# Patient Record
Sex: FEMALE | Race: WHITE | NOT HISPANIC OR LATINO | Employment: FULL TIME | RURAL
[De-identification: names, ages, dates, MRNs, and addresses within clinical notes are randomized per-mention and may not be internally consistent; named-entity substitution may affect disease eponyms.]

---
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
## 2023-11-13 ENCOUNTER — HOSPITAL ENCOUNTER (EMERGENCY)
Facility: HOSPITAL | Age: 23
Discharge: HOME | End: 2023-11-13
Payer: COMMERCIAL

## 2023-11-13 VITALS
WEIGHT: 158 LBS | RESPIRATION RATE: 18 BRPM | BODY MASS INDEX: 29.83 KG/M2 | TEMPERATURE: 98 F | SYSTOLIC BLOOD PRESSURE: 137 MMHG | OXYGEN SATURATION: 100 % | HEIGHT: 61 IN | HEART RATE: 90 BPM | DIASTOLIC BLOOD PRESSURE: 83 MMHG

## 2023-11-13 DIAGNOSIS — S89.91XA INJURY OF RIGHT LOWER LEG: ICD-10-CM

## 2023-11-13 DIAGNOSIS — S80.11XA CONTUSION OF RIGHT LOWER LEG, INITIAL ENCOUNTER: Primary | ICD-10-CM

## 2023-11-13 PROCEDURE — 99284 PR EMERGENCY DEPT VISIT,LEVEL IV: ICD-10-PCS | Mod: ,,, | Performed by: NURSE PRACTITIONER

## 2023-11-13 PROCEDURE — 99284 EMERGENCY DEPT VISIT MOD MDM: CPT | Mod: ,,, | Performed by: NURSE PRACTITIONER

## 2023-11-13 PROCEDURE — 99283 EMERGENCY DEPT VISIT LOW MDM: CPT

## 2023-11-13 RX ORDER — IBUPROFEN 800 MG/1
800 TABLET ORAL 3 TIMES DAILY
Qty: 20 TABLET | Refills: 0 | Status: SHIPPED | OUTPATIENT
Start: 2023-11-13

## 2023-11-13 NOTE — Clinical Note
"Lakia"NINOSKA Dukes was seen and treated in our emergency department on 11/13/2023.  She may return to work on 11/15/2023.       If you have any questions or concerns, please don't hesitate to call.      Licha Lentz, FNP"

## 2023-11-14 NOTE — DISCHARGE INSTRUCTIONS
Rest ice and elevate extremity for comfort.  Crutches for nonweightbearing until symptoms improve.  Take medications as prescribed.  Follow up with primary care provider in 2 days if symptoms do not improve with treatment.  Return to the ER with new or worsening symptoms.

## 2023-11-14 NOTE — ED PROVIDER NOTES
Encounter Date: 11/13/2023       History     Chief Complaint   Patient presents with    Leg Injury     Pt was standing on top of a ladder and fell. R lower leg pain and swelling.     Patient was brought to the ER by her mother and father.  Patient reports she slipped on a ladder and injured her right lower extremity.  She states the ladder fell against her leg just above her ankle.  She states her right leg is painful to bear weight.  There is small abrasion noted on the shin bone.  Area swollen and tender to touch.  Patient denies hitting her head or LOC.    The history is provided by the patient and a parent. No  was used.     Review of patient's allergies indicates:   Allergen Reactions    Bactrim [sulfamethoxazole-trimethoprim] Hives    Sulfa (sulfonamide antibiotics) Hives     No past medical history on file.  No past surgical history on file.  No family history on file.     Review of Systems   Constitutional:  Positive for activity change and fatigue.   Musculoskeletal:  Positive for arthralgias and myalgias.        Right lower extremity pain and edema   All other systems reviewed and are negative.      Physical Exam     Initial Vitals [11/13/23 2049]   BP Pulse Resp Temp SpO2   137/83 107 20 97.5 °F (36.4 °C) 97 %      MAP       --         Physical Exam    Nursing note and vitals reviewed.  Constitutional: She appears well-developed and well-nourished.   HENT:   Head: Normocephalic.   Nose: Nose normal.   Mouth/Throat: Oropharynx is clear and moist.   Eyes: EOM are normal.   Neck:   Normal range of motion.  Cardiovascular:  Normal rate, regular rhythm, normal heart sounds and intact distal pulses.           Pulmonary/Chest: Breath sounds normal.   Abdominal: Bowel sounds are normal.   Musculoskeletal:         General: Tenderness and edema present.      Cervical back: Normal range of motion.     Neurological: She is alert and oriented to person, place, and time. She has normal strength. GCS  score is 15. GCS eye subscore is 4. GCS verbal subscore is 5. GCS motor subscore is 6.   Skin: Skin is warm and dry. Capillary refill takes less than 2 seconds. Abrasion noted.        Psychiatric: She has a normal mood and affect.         Medical Screening Exam   See Full Note    ED Course   Procedures  Labs Reviewed - No data to display       Imaging Results              X-Ray Tibia Fibula 2 View Right (In process)                      Medications - No data to display  Medical Decision Making  Patient was brought to the ER by her mother and father.  Patient reports she slipped on a ladder and injured her right lower extremity.  She states the ladder fell against her leg just above her ankle.  She states her right leg is painful to bear weight.  There is small abrasion noted on the shin bone.  Area swollen and tender to touch.  Patient denies hitting her head or LOC.      Amount and/or Complexity of Data Reviewed  Independent Historian: parent  Radiology: ordered. Decision-making details documented in ED Course.  Discussion of management or test interpretation with external provider(s): Ace wrap for comfort.    Patient was discharged home with diagnosis of injury to right lower leg contusion to right lower leg initial encounter.  She was given prescription for ibuprofen 800 mg to take as prescribed.  She was told to rest ice and elevate extremity for comfort.  She was told to follow up with primary care provider in 2 days if symptoms do not improve with treatment.    Risk  Prescription drug management.                               Clinical Impression:   Final diagnoses:  [S89.91XA] Injury of right lower leg  [S80.11XA] Contusion of right lower leg, initial encounter (Primary)        ED Disposition Condition    Discharge Stable          ED Prescriptions       Medication Sig Dispense Start Date End Date Auth. Provider    ibuprofen (ADVIL,MOTRIN) 800 MG tablet Take 1 tablet (800 mg total) by mouth 3 (three) times  daily. 20 tablet 11/13/2023 -- Licha Lentz FNP          Follow-up Information       Follow up With Specialties Details Why Contact Info    Primary Care Provider  Schedule an appointment as soon as possible for a visit in 2 days If symptoms worsen              Licha Lentz FNP  11/13/23 9917

## 2023-11-17 ENCOUNTER — TELEPHONE (OUTPATIENT)
Dept: OBSTETRICS AND GYNECOLOGY | Facility: CLINIC | Age: 23
End: 2023-11-17

## 2023-12-06 ENCOUNTER — PATIENT MESSAGE (OUTPATIENT)
Dept: OBSTETRICS AND GYNECOLOGY | Facility: CLINIC | Age: 23
End: 2023-12-06

## 2023-12-14 ENCOUNTER — OFFICE VISIT (OUTPATIENT)
Dept: OBSTETRICS AND GYNECOLOGY | Facility: CLINIC | Age: 23
End: 2023-12-14
Payer: COMMERCIAL

## 2023-12-14 VITALS
SYSTOLIC BLOOD PRESSURE: 110 MMHG | DIASTOLIC BLOOD PRESSURE: 70 MMHG | HEART RATE: 70 BPM | WEIGHT: 158 LBS | HEIGHT: 61 IN | BODY MASS INDEX: 29.83 KG/M2

## 2023-12-14 DIAGNOSIS — Z01.419 NORMAL GYNECOLOGIC EXAMINATION: Primary | ICD-10-CM

## 2023-12-14 DIAGNOSIS — Z12.4 SCREENING FOR CERVICAL CANCER: ICD-10-CM

## 2023-12-14 PROCEDURE — 3074F PR MOST RECENT SYSTOLIC BLOOD PRESSURE < 130 MM HG: ICD-10-PCS | Mod: ,,, | Performed by: ADVANCED PRACTICE MIDWIFE

## 2023-12-14 PROCEDURE — 3078F DIAST BP <80 MM HG: CPT | Mod: ,,, | Performed by: ADVANCED PRACTICE MIDWIFE

## 2023-12-14 PROCEDURE — 3008F BODY MASS INDEX DOCD: CPT | Mod: ,,, | Performed by: ADVANCED PRACTICE MIDWIFE

## 2023-12-14 PROCEDURE — 99213 OFFICE O/P EST LOW 20 MIN: CPT | Mod: PBBFAC | Performed by: ADVANCED PRACTICE MIDWIFE

## 2023-12-14 PROCEDURE — 99395 PR PREVENTIVE VISIT,EST,18-39: ICD-10-PCS | Mod: S$PBB,,, | Performed by: ADVANCED PRACTICE MIDWIFE

## 2023-12-14 PROCEDURE — 3008F PR BODY MASS INDEX (BMI) DOCUMENTED: ICD-10-PCS | Mod: ,,, | Performed by: ADVANCED PRACTICE MIDWIFE

## 2023-12-14 PROCEDURE — 1159F PR MEDICATION LIST DOCUMENTED IN MEDICAL RECORD: ICD-10-PCS | Mod: ,,, | Performed by: ADVANCED PRACTICE MIDWIFE

## 2023-12-14 PROCEDURE — 99395 PREV VISIT EST AGE 18-39: CPT | Mod: S$PBB,,, | Performed by: ADVANCED PRACTICE MIDWIFE

## 2023-12-14 PROCEDURE — 88142 CYTOPATH C/V THIN LAYER: CPT | Mod: TC,GCY | Performed by: ADVANCED PRACTICE MIDWIFE

## 2023-12-14 PROCEDURE — 3074F SYST BP LT 130 MM HG: CPT | Mod: ,,, | Performed by: ADVANCED PRACTICE MIDWIFE

## 2023-12-14 PROCEDURE — 3078F PR MOST RECENT DIASTOLIC BLOOD PRESSURE < 80 MM HG: ICD-10-PCS | Mod: ,,, | Performed by: ADVANCED PRACTICE MIDWIFE

## 2023-12-14 PROCEDURE — 1159F MED LIST DOCD IN RCRD: CPT | Mod: ,,, | Performed by: ADVANCED PRACTICE MIDWIFE

## 2023-12-14 RX ORDER — NORGESTIMATE AND ETHINYL ESTRADIOL 7DAYSX3 LO
KIT ORAL
COMMUNITY
Start: 2023-05-04 | End: 2025-03-29

## 2023-12-14 NOTE — PROGRESS NOTES
Subjective     Patient ID: Lakia Dukes is a 23 y.o. female.    Chief Complaint: Gynecologic Exam (In for Annual Exam. Denies any problems at this time. Pt has never had a pap.)    Pap collected today.   Periods regular lasting 4-6 days.  Denies any complaints or problems.       Gynecologic Exam      Review of Systems   Constitutional: Negative.    HENT: Negative.     Eyes: Negative.    Respiratory: Negative.     Cardiovascular: Negative.    Gastrointestinal: Negative.    Endocrine: Negative.    Genitourinary: Negative.    Musculoskeletal: Negative.    Integumentary:  Negative.   Allergic/Immunologic: Negative.    Neurological: Negative.    Psychiatric/Behavioral: Negative.            Objective     Physical Exam  Vitals reviewed. Exam conducted with a chaperone present.   Constitutional:       Appearance: Normal appearance.   HENT:      Head: Normocephalic.   Cardiovascular:      Rate and Rhythm: Normal rate and regular rhythm.   Pulmonary:      Effort: Pulmonary effort is normal.      Breath sounds: Normal breath sounds.   Chest:   Breasts:     Right: Normal. No mass.      Left: Normal. No mass.   Abdominal:      General: Abdomen is flat.      Palpations: Abdomen is soft.   Genitourinary:     General: Normal vulva.      Exam position: Lithotomy position.      Vagina: Normal.      Cervix: No cervical motion tenderness.      Uterus: Normal. Not tender.       Adnexa: Right adnexa normal and left adnexa normal.        Right: No mass.          Left: No mass.     Musculoskeletal:         General: Normal range of motion.      Cervical back: Normal range of motion.   Skin:     General: Skin is warm and dry.   Neurological:      Mental Status: She is alert and oriented to person, place, and time.   Psychiatric:         Mood and Affect: Mood normal.         Behavior: Behavior normal.            Assessment and Plan     1. Normal gynecologic examination    2. Screening for cervical cancer  -     ThinPrep Pap  Test             Follow up in about 1 year (around 12/14/2024), or if symptoms worsen or fail to improve, for Annual Exam.

## 2023-12-18 LAB
GH SERPL-MCNC: NORMAL NG/ML
INSULIN SERPL-ACNC: NORMAL U[IU]/ML
LAB AP CLINICAL INFORMATION: NORMAL
LAB AP GYN INTERPRETATION: NEGATIVE
LAB AP PAP DISCLAIMER COMMENTS: NORMAL
RENIN PLAS-CCNC: NORMAL NG/ML/H

## 2024-01-29 ENCOUNTER — PATIENT MESSAGE (OUTPATIENT)
Dept: OBSTETRICS AND GYNECOLOGY | Facility: CLINIC | Age: 24
End: 2024-01-29

## 2024-02-06 ENCOUNTER — TELEPHONE (OUTPATIENT)
Dept: OBSTETRICS AND GYNECOLOGY | Facility: CLINIC | Age: 24
End: 2024-02-06

## 2024-02-21 DIAGNOSIS — S89.92XA INJURY OF LEFT KNEE, INITIAL ENCOUNTER: Primary | ICD-10-CM

## 2024-02-22 ENCOUNTER — OFFICE VISIT (OUTPATIENT)
Dept: ORTHOPEDICS | Facility: CLINIC | Age: 24
End: 2024-02-22

## 2024-02-22 ENCOUNTER — HOSPITAL ENCOUNTER (OUTPATIENT)
Dept: RADIOLOGY | Facility: HOSPITAL | Age: 24
Discharge: HOME | End: 2024-02-22
Attending: NURSE PRACTITIONER

## 2024-02-22 DIAGNOSIS — S89.92XA INJURY OF LEFT KNEE, INITIAL ENCOUNTER: ICD-10-CM

## 2024-02-22 DIAGNOSIS — M25.579 PAIN IN JOINT INVOLVING ANKLE AND FOOT, UNSPECIFIED LATERALITY: Primary | ICD-10-CM

## 2024-02-22 PROCEDURE — 73610 X-RAY EXAM OF ANKLE: CPT | Mod: TC,RT

## 2024-02-22 PROCEDURE — 99203 OFFICE O/P NEW LOW 30 MIN: CPT | Mod: S$PBB,,, | Performed by: NURSE PRACTITIONER

## 2024-02-22 PROCEDURE — 99212 OFFICE O/P EST SF 10 MIN: CPT | Mod: PBBFAC,25 | Performed by: NURSE PRACTITIONER

## 2024-02-22 PROCEDURE — 73610 X-RAY EXAM OF ANKLE: CPT | Mod: 26,RT,, | Performed by: STUDENT IN AN ORGANIZED HEALTH CARE EDUCATION/TRAINING PROGRAM

## 2024-02-22 NOTE — PROGRESS NOTES
23 y.o. Female returns to clinic for a follow up visit regarding     ICD-10-CM ICD-9-CM   1. Pain in joint involving ankle and foot, unspecified laterality  M25.579 719.12        Patient is here today for a right lower and swelling including the ankle.  Reports she fell on a ladder in December, the ladder fell on top of her right lower leg.  She was seen in the emergency department.  Reports she has had bruising and swelling in the ankle and right lower extremity since that time.  She does have a large area of soft tissue swelling proximal to her ankle joint today.  Reports she has hurt clicking in her ankle in it is painful to dorsiflex and plantarflex       No past medical history on file.  No past surgical history on file.      PHYSICAL EXAMINATION:    General    Constitutional: She is oriented to person, place, and time. She appears well-nourished.   HENT:   Head: Normocephalic and atraumatic.   Eyes: Pupils are equal, round, and reactive to light.   Neck: Neck supple.   Cardiovascular:  Normal rate and regular rhythm.            Pulmonary/Chest: Effort normal. No respiratory distress.   Abdominal: There is no abdominal tenderness. There is no guarding.   Neurological: She is alert and oriented to person, place, and time. She has normal reflexes.   Psychiatric: She has a normal mood and affect. Her behavior is normal. Judgment and thought content normal.         Right Ankle/Foot Exam     Inspection   Deformity: absent  Erythema: absent  Bruising: Ankle - present Foot - absent  Effusion: Ankle - present Foot - absent    Pain   The patient exhibits pain of the lateral malleolus and peroneals.    Range of Motion   Ankle Joint   Dorsiflexion:  normal   Plantar flexion:  normal     Other   Sensation: normal    Comments:  Soft tissue swelling above the lateral side of right ankle        Vascular Exam     Right Pulses  Dorsalis Pedis:      2+          IMAGING:  No results found.   EXAMINATION:  XR ANKLE COMPLETE 3  VIEW RIGHT     CLINICAL HISTORY:  Unspecified injury of left lower leg, initial encounter     TECHNIQUE:  XR ANKLE COMPLETE 3 VIEW RIGHT     COMPARISON:  11/13/23     FINDINGS:  No acute fracture or dislocation.     No joint abnormality.     No radiopaque foreign bodies.     Impression:     No acute findings        Electronically signed by: Oscar Dumont  Date:                                            02/22/2024  Time:                                           14:05           Exam Ended: 02/22/24 13:08 CST Last Resulted: 02/22/24 14:05 CST               ASSESSMENT:      ICD-10-CM ICD-9-CM   1. Pain in joint involving ankle and foot, unspecified laterality  M25.579 719.47       PLAN:     -Findings and treatment options were discussed with the patient  -All questions answered    MRI right ankle including lower extremity.  We will have patient return to clinic with 1 of the physicians after to discuss results    There are no Patient Instructions on file for this visit.      Orders Placed This Encounter   Procedures    MRI Ankle Without Contrast Right         Procedures

## 2024-03-18 ENCOUNTER — TELEPHONE (OUTPATIENT)
Dept: ORTHOPEDICS | Facility: CLINIC | Age: 24
End: 2024-03-18

## 2024-03-18 NOTE — TELEPHONE ENCOUNTER
PATIENT NOTIFIED; SCHEDULE WITH DR. AGUDELO     ----- Message from EVA Byrd sent at 3/18/2024 12:59 PM CDT -----  If patient is not getting any better, please have her return with appropriate physician

## 2024-03-21 ENCOUNTER — OFFICE VISIT (OUTPATIENT)
Dept: DERMATOLOGY | Facility: CLINIC | Age: 24
End: 2024-03-21

## 2024-03-21 DIAGNOSIS — D22.9 MULTIPLE BENIGN NEVI: ICD-10-CM

## 2024-03-21 DIAGNOSIS — L70.0 ACNE VULGARIS: Primary | ICD-10-CM

## 2024-03-21 PROCEDURE — 99203 OFFICE O/P NEW LOW 30 MIN: CPT | Mod: ,,, | Performed by: STUDENT IN AN ORGANIZED HEALTH CARE EDUCATION/TRAINING PROGRAM

## 2024-03-21 RX ORDER — TRETINOIN 1 MG/G
CREAM TOPICAL NIGHTLY
Qty: 45 G | Refills: 11 | Status: SHIPPED | OUTPATIENT
Start: 2024-03-21

## 2024-03-21 NOTE — PROGRESS NOTES
[facility]  Maximino Singh MD    [facility] [address] [place] [address]  [phone]    Fax: [phone]    Patient Name: Lakia Dukes  Medical Record Number: [id number]  PCP: Mary, Primary Doctor  Age: 24 y.o. : 2000  Contact: [phone] (home)     CC: acne  History of Present Illness:     Lakia Dukes is a 24 y.o.  female with no history of skin cancer who presents to clinic today for acne of the face that has been treated with Tretinoin with improvement when using. She is also concerned with moles of bilateral arms.     The patient has no other concerns today.    Review of Systems:     Unremarkable other than mentioned above.     Physical Exam:     General: Relaxed, oriented, alert    Skin examination of the scalp, face, neck, chest, back, abdomen, upper extremities and lower extremities were normal except for as listed below      Assessment and Plan:     1. Acne Vulgaris   - Cysts, inflammatory papules and pustules, scars, and comedonal papules    Status: mild     Plan:   - continue Tretinoin 1% HS   - pt will call for severe flares     I counseled the regarding the following:  Skin care: I discussed with the patient the importance of using cleansers, moisturizers and cosmetics that are  non-comedogenic.  Expectations: The patient is aware that it may take up to 2-3 months to see a 60-80% improvement of acne.      2. Benign appearing melanocytic nevi   - no lesions appear clinically or dermatoscopically atypical enough to warrant biopsy at this time  - The patient was counseled on the ABCDE's of melanoma and on the importance of performing monthly self skin checks at home in between visits and will call our clinic with changes.  - discussed importance of sunscreen SPF 30 or greater         Return to clinic in 1 year.     AVS printed with patient instructions     Maximino Singh MD   Mohs Surgery/Dermatologic Oncology  Dermatology

## 2024-03-21 NOTE — PATIENT INSTRUCTIONS
Sunscreen Recommendations  I recommended a broad spectrum sunscreen with a SPF of 30 or higher that is water-resistant. SPF 30 sunscreens block approximately 97 percent of the sun's harmful rays.   The best sunscreen is one that you will wear   My personal preference is a mineral sunscreen, such as one containing zinc oxide or titanium dioxide. Some brands may leave a white appearance after applying, but usually improves once rubbed in.   Sunscreens should be applied at least 15 minutes prior to expected sun exposure and then every 90 minutes after that as long as sun exposure continues.   If swimming or exercising sunscreen should be reapplied every 45 minutes to an hour after getting wet or sweating.  One ounce, or the equivalent of a shot glass full of sunscreen, is adequate to protect the skin not covered by a bathing suit.   I also recommended a lip balm with a sunscreen as well.     Healthy Sun Protective Behaviors  Sun protective clothing can be used in lieu of sunscreen but must be worn the entire time you are exposed to the sun's rays.  Seek shade between 10 a.m. and 2 p.m.  Use extra caution near water, snow, or sand as they reflect sun rays  Avoid tanning beds and consider sunless self-tanning products instead  Perform monthly self skin exams

## 2024-03-26 ENCOUNTER — OFFICE VISIT (OUTPATIENT)
Dept: ORTHOPEDICS | Facility: CLINIC | Age: 24
End: 2024-03-26

## 2024-03-26 ENCOUNTER — PATIENT MESSAGE (OUTPATIENT)
Dept: ORTHOPEDICS | Facility: CLINIC | Age: 24
End: 2024-03-26

## 2024-03-26 DIAGNOSIS — M25.579 PAIN IN JOINT INVOLVING ANKLE AND FOOT, UNSPECIFIED LATERALITY: Primary | ICD-10-CM

## 2024-03-26 PROCEDURE — 99214 OFFICE O/P EST MOD 30 MIN: CPT | Mod: S$PBB,,, | Performed by: ORTHOPAEDIC SURGERY

## 2024-03-26 PROCEDURE — 99213 OFFICE O/P EST LOW 20 MIN: CPT | Mod: PBBFAC | Performed by: ORTHOPAEDIC SURGERY

## 2024-03-26 RX ORDER — NAPROXEN 500 MG/1
500 TABLET ORAL 2 TIMES DAILY WITH MEALS
Qty: 60 TABLET | Refills: 3 | Status: SHIPPED | OUTPATIENT
Start: 2024-03-26

## 2024-03-26 RX ORDER — METHYLPREDNISOLONE 4 MG/1
TABLET ORAL
Qty: 21 EACH | Refills: 0 | Status: SHIPPED | OUTPATIENT
Start: 2024-03-26 | End: 2024-04-16

## 2024-03-26 RX ORDER — NAPROXEN 500 MG/1
500 TABLET ORAL 2 TIMES DAILY WITH MEALS
Qty: 60 TABLET | Refills: 3 | Status: SHIPPED | OUTPATIENT
Start: 2024-03-26 | End: 2024-03-26

## 2024-03-26 RX ORDER — METHYLPREDNISOLONE 4 MG/1
TABLET ORAL
Qty: 21 EACH | Refills: 0 | Status: SHIPPED | OUTPATIENT
Start: 2024-03-26 | End: 2024-03-26

## 2024-03-26 NOTE — PROGRESS NOTES
24 y.o. Female returns to clinic for a follow up visit regarding     ICD-10-CM ICD-9-CM   1. Pain in joint involving ankle and foot, unspecified laterality  M25.579 719.47     Patient fell off a ladder in December 2023. She continues to have ankle pain.   She reports her symptoms are still about the same.   She was seen by Demi in Feb and an MRI was obtained.     She is here today to review the results of her MRI and to discuss treatment moving forward.             History reviewed. No pertinent past medical history.  History reviewed. No pertinent surgical history.      PHYSICAL EXAMINATION:    Ortho/SPM Exam  Was an area of swelling just proximal to the tibiotalar joint the measures 4 cm x 2 cm in size.  This is a ballotable area appears to be consistent with an old hematoma or seroma.    IMAGING:  MRI Ankle Without Contrast Right    Result Date: 3/18/2024  EXAMINATION: MRI ANKLE WITHOUT CONTRAST RIGHT CLINICAL HISTORY: Ankle pain, stress fracture suspected, neg xray; Pain in unspecified ankle and joints of unspecified foot TECHNIQUE: Sagittal, axial and coronal imaging of the right ankle was performed using T1, T2, STIR and gradient sequence. COMPARISON: None available FINDINGS: Joint alignment is normal. No joint effusion is seen. No abnormal osseous marrow signal is present.  Complex fluid collection anterolateral soft tissues proximal ankle size estimates 3.95 x 3.1 x 0.74 cm. Muscles, tendons and ligaments are intact without signal abnormality. No abnormal fluid collections or other signal abnormality is seen in the soft tissues. No other abnormalities are demonstrated.     Complex fluid collection soft tissues likely hematoma.  No other evidence of abnormality demonstrated Electronically signed by: Paras Blakely Date:    03/18/2024 Time:    10:17       ASSESSMENT:      ICD-10-CM ICD-9-CM   1. Pain in joint involving ankle and foot, unspecified laterality  M25.579 719.47       PLAN:     -Findings and  treatment options were discussed with the patient  -All questions answered      I attempted an aspiration today and this was unsuccessful.  No fluid was able to be obtained.  I am going to recommend compression stocking steroid Dosepak as well as anti-inflammatories and see if this helps alleviate the swelling.  Will see her back in about 4 weeks for recheck        There are no Patient Instructions on file for this visit.      Orders Placed This Encounter   Procedures    COMPRESSION STOCKINGS         Procedures

## 2024-04-23 ENCOUNTER — TELEPHONE (OUTPATIENT)
Dept: ORTHOPEDICS | Facility: CLINIC | Age: 24
End: 2024-04-23

## 2024-04-23 ENCOUNTER — PATIENT MESSAGE (OUTPATIENT)
Dept: ORTHOPEDICS | Facility: CLINIC | Age: 24
End: 2024-04-23

## 2024-04-23 DIAGNOSIS — M25.579 PAIN IN JOINT INVOLVING ANKLE AND FOOT, UNSPECIFIED LATERALITY: Primary | ICD-10-CM

## 2024-04-23 NOTE — TELEPHONE ENCOUNTER
----- Message from Ashley Bruner sent at 4/23/2024  8:46 AM CDT -----  Pt calling to speak with nurse - states she had a f/u appt on 4/25 but had to reschedule to next opening which was 5/16 - states problem has not gotten any better so wanting to see if mri could go ahead and be ordered - call back # [phone] or [phone]

## 2024-05-16 ENCOUNTER — OFFICE VISIT (OUTPATIENT)
Dept: ORTHOPEDICS | Facility: CLINIC | Age: 24
End: 2024-05-16

## 2024-05-16 DIAGNOSIS — S80.11XD CONTUSION OF RIGHT LOWER LEG, SUBSEQUENT ENCOUNTER: Primary | ICD-10-CM

## 2024-05-16 PROCEDURE — 99213 OFFICE O/P EST LOW 20 MIN: CPT | Mod: S$PBB,,, | Performed by: ORTHOPAEDIC SURGERY

## 2024-05-16 PROCEDURE — 99212 OFFICE O/P EST SF 10 MIN: CPT | Mod: PBBFAC | Performed by: ORTHOPAEDIC SURGERY

## 2024-05-16 NOTE — PROGRESS NOTES
24 y.o. Female returns to clinic for a follow up visit regarding     ICD-10-CM ICD-9-CM   1. Contusion of right lower leg, subsequent encounter  S80.11XD V58.89     924.10        Patient is here for MRI follow up and treatment moving forward.       No past medical history on file.  No past surgical history on file.      PHYSICAL EXAMINATION:    Ortho/SPM Exam  Small area of swelling along the right anterior distal tibia-- greatly improved compared to last visit    IMAGING:  MRI Tibia Fibula W WO Contrast Right    Result Date: 5/2/2024  EXAMINATION: MRI TIBIA FIBULA W WO CONTRAST RIGHT CLINICAL HISTORY: RIGHT ANKLE PAIN; Pain in unspecified ankle and joints of unspecified foot TECHNIQUE: Sagittal, axial and coronal imaging of the right tibia fibula was performed using T1, T2, STIR and gradient sequence.  Sequences performed both prior to and after contrast, dose 15 cc MultiHance intravenous. COMPARISON: None available FINDINGS: Joint alignment is normal. No joint effusion is seen. No abnormal osseous marrow signal is present. Muscles, tendons and ligaments are intact without signal abnormality. Linear area of T1 signal hypointensity and T2 signal hyperintensity present in the anterior soft tissues distally.  No abnormal enhancement in this area. No other abnormal fluid collections or other signal abnormality is seen in the soft tissues.  No abnormal enhancement seen.  No other abnormalities are demonstrated.     Linear area of signal abnormality in the anterior soft tissues,  appearance is nonspecific could indicate previous injury and scarring Electronically signed by: Paras Blakely Date:    05/02/2024 Time:    15:21       ASSESSMENT:      ICD-10-CM ICD-9-CM   1. Contusion of right lower leg, subsequent encounter  S80.11XD V58.89     924.10       PLAN:     -Findings and treatment options were discussed with the patient  -All questions answered    She has a contusion that appears to be resolving.  I have seen  MRIs over the last 2 months which show resolving area of hematoma.  This should not limit her in any way in a potential  career.  Okay to use rest ice anti-inflammatories and occasionally compression stockings if needed.  My anticipation is that this should be 100% resolved in the next 2 months.      There are no Patient Instructions on file for this visit.      No orders of the defined types were placed in this encounter.        Procedures

## 2025-08-06 DIAGNOSIS — L70.0 ACNE VULGARIS: ICD-10-CM

## 2025-08-06 RX ORDER — TRETINOIN 1 MG/G
CREAM TOPICAL NIGHTLY
Qty: 45 G | Refills: 11 | Status: SHIPPED | OUTPATIENT
Start: 2025-08-06

## 2025-08-27 ENCOUNTER — TELEPHONE (OUTPATIENT)
Dept: FAMILY MEDICINE | Facility: CLINIC | Age: 25
End: 2025-08-27

## 2025-09-03 PROBLEM — Z00.00 ROUTINE GENERAL MEDICAL EXAMINATION AT A HEALTH CARE FACILITY: Status: ACTIVE | Noted: 2025-09-03

## 2025-09-03 PROBLEM — K59.04 CHRONIC IDIOPATHIC CONSTIPATION: Status: ACTIVE | Noted: 2025-09-03

## 2025-09-03 PROBLEM — M25.561 ACUTE PAIN OF RIGHT KNEE: Status: ACTIVE | Noted: 2025-09-03

## 2025-09-04 ENCOUNTER — RESULTS FOLLOW-UP (OUTPATIENT)
Dept: FAMILY MEDICINE | Facility: CLINIC | Age: 25
End: 2025-09-04
Payer: COMMERCIAL